# Patient Record
Sex: FEMALE | URBAN - METROPOLITAN AREA
[De-identification: names, ages, dates, MRNs, and addresses within clinical notes are randomized per-mention and may not be internally consistent; named-entity substitution may affect disease eponyms.]

---

## 2018-06-05 ENCOUNTER — APPOINTMENT (RX ONLY)
Dept: URBAN - METROPOLITAN AREA CLINIC 97 | Facility: CLINIC | Age: 23
Setting detail: DERMATOLOGY
End: 2018-06-05

## 2018-06-05 DIAGNOSIS — L70.0 ACNE VULGARIS: ICD-10-CM

## 2018-06-05 PROBLEM — L85.3 XEROSIS CUTIS: Status: ACTIVE | Noted: 2018-06-05

## 2018-06-05 PROCEDURE — ? COUNSELING

## 2018-06-05 PROCEDURE — ? PRESCRIPTION

## 2018-06-05 PROCEDURE — ? RECOMMENDATIONS

## 2018-06-05 PROCEDURE — 99202 OFFICE O/P NEW SF 15 MIN: CPT

## 2018-06-05 PROCEDURE — ? PATIENT EDUCATION

## 2018-06-05 RX ORDER — DOXYCYCLINE HYCLATE 100 MG/1
CAPSULE, GELATIN COATED ORAL
Qty: 30 | Refills: 0 | Status: ERX | COMMUNITY
Start: 2018-06-05

## 2018-06-05 RX ORDER — CLINDAMYCIN PHOSPHATE AND BENZOYL PEROXIDE 10; 50 MG/G; MG/G
GEL TOPICAL
Qty: 1 | Refills: 3 | Status: ERX | COMMUNITY
Start: 2018-06-05

## 2018-06-05 RX ADMIN — CLINDAMYCIN PHOSPHATE AND BENZOYL PEROXIDE: 10; 50 GEL TOPICAL at 00:00

## 2018-06-05 RX ADMIN — DOXYCYCLINE HYCLATE: 100 CAPSULE, GELATIN COATED ORAL at 00:00

## 2018-06-05 ASSESSMENT — LOCATION SIMPLE DESCRIPTION DERM
LOCATION SIMPLE: LEFT CHEEK
LOCATION SIMPLE: RIGHT UPPER BACK

## 2018-06-05 ASSESSMENT — LOCATION ZONE DERM
LOCATION ZONE: FACE
LOCATION ZONE: TRUNK

## 2018-06-05 ASSESSMENT — LOCATION DETAILED DESCRIPTION DERM
LOCATION DETAILED: RIGHT MID-UPPER BACK
LOCATION DETAILED: LEFT CENTRAL MALAR CHEEK

## 2018-06-05 NOTE — PROCEDURE: RECOMMENDATIONS
Recommendation Preamble: The following recommendations were made during the visit: Panoxyl wash
Detail Level: Zone

## 2018-06-05 NOTE — PROCEDURE: PATIENT EDUCATION
Glycolic Acid Chemical Peel Counseling: Pre-Peel Care/Maintenance Skin Care\\nCLEANSE:\\no	Sensitive or Dry Skin:  R-Essentials Gentle Antioxidant Soothing Cleanser (AM/PM)\\no	Tolerant Skin:  R-Essentials 5/2 or 10/2 Cleanser (AM/PM)\\nTREAT:\\no	Anti-Aging Treatment:  R-Essentials AHA 10% - 15% - 20% Cream (AM)\\no	Exfoliation Treatment:  R-Essentials Retinol Serum 2x - 3x - 5x - 10x (PM)\\no	Calm and Moisturize:  R-Essentials Triple Antioxidant Moisturizing Cream (AM/PM)\\no	Persistent Uneven Skin Tone: Prescription \\nPROTECT:\\n           Physical Sunscreen Cream SPF 30+ (Daily)\\n\\nPost-Peel Care (Day 1 through up to 10 days)\\nCLEANSE:\\no	Sensitive, Dry or Tolerant Skin:  R-Essentials Gentle Antioxidant Soothing Cleanser (AM/PM)\\nSOOTHE AND MOISTURIZE:\\n  (During the 3-5 day active peeling period, application of moisturizing creams may initially sting, however this should subside in 1 to 3 minutes)\\no	Perfect Peel 10 Soothing Recovery Ointment (apply as often as needed to irritated skin areas)\\no	R-Essentials Triple Antioxidant Moisturizing Cream (AM/PM)\\no	OTC 1% Hydrocortisone Cream for extremely irritated skin areas (up to three times/day)\\no	Additional moisturizing if needed:  R-Essentials Lite or Ultra Moisture Cream (AM/PM)\\nPROTECT:\\no	Physical Sunscreen Cream SPF 30+ (Daily)\\nRESUME:\\no	Pre-Peel Care/Maintenance Skin Care:  7-10 days post peel procedure or once your skin returns to normal\\n\\n(Remember to advance strength of R-Essentials AHA Cream (10% - 15%- 20%) and \\n            R-Essentials Retinol Serum (2x - 3x - 5x -10x) as skin tolerates to have optimal results)\\n

## 2018-06-05 NOTE — HPI: PIMPLES (ACNE)
How Severe Is Your Acne?: mild
Is This A New Presentation, Or A Follow-Up?: Acne
Additional Comments (Use Complete Sentences): Pt has Implanon for birth control

## 2018-06-05 NOTE — PROCEDURE: PATIENT EDUCATION
Automatic Acne Counseling: Acne is a skin condition affecting the hair follicles and oil glands.  The body areas with the most hair follicles and oil glands are the face, upper chest and back.  Acne develops as a result of blockages in the follicles.  A plug of dead skin cells and sebum, a naturally occurring oil, forms a micro-comedo that can enlarge to form an open comedone (blackhead) or a closed comedone (white head).  Under these circumstances, the normal occurring bacteria, Propionibacterium acnes, can cause inflammation around the comedone leading to papules (red bumps), pustules (pus bumps) and nodules (bumps under skin). \\n\\nSeveral factors can contribute to acne:\\n•	Hormonal - Androgen (a type of hormone) causes oil glands to enlarge and produce more sebum. This hormonal influence is mostly seen during puberty. \\n•	Genetics- History of family members (parents or siblings) with acne.\\n•	Stress                                     \\n•	External factors- Mechanical trauma (picking at lesions and/or aggressive exfoliation with skin brushes or overly gritty exfoliant products, use of cosmetics that promote pore clogging such as pomade products or products that are not “non-comedogenic), topical steroids, or some oral medications (such as lithium)\\n\\nAcne management focus:\\n•	Reduce skin shedding into the pore to prevent blockage\\n•	Kill the Propionibacterium acnes bacteria\\n•	Achieve anti-inflammatory effects in the skin\\n•	Oil gland regulation\\n\\nTreatment(s):\\n\\n•	Acne Cleansers:  Help to exfoliate skin, penetrate follicles and decrease inflammation. Wash face with cleanser every morning and night with cold water.\\n\\n•	Topical Retinoid creams/gels:  Help unplug and normalize the pore. (Initially, can cause skin dryness/scaling). Only use a pea size amount on the face and begin with short term contact (few evening hours every other day and advance to every night/overnight application. (If too much dryness/scaling occurs, use with a small amount of over the counter CeraVe cream).                                      \\n\\n•	Topical Antibiotic cream/gels:  Decrease the bacteria and inflammation in the pores. (Can cause short term redness and burning sensation). Only use a pea size amount every other morning and advance to every morning. \\n\\n•	Combination Topical cream/gels: (Retinoid and Antibiotic). See above. \\n\\n•	Oral Antibiotics: (Should NOT be pregnant and/or breastfeeding).  Solodyn, Minocycline, Doryx, and Doxycycline are the most commonly used oral antibiotics to treat moderate to severe acne and are safe to take intermittently for months at a time. Side effects include sun sensitivity, mild dizziness, stomach upset, and heartburn. (If you experience a sudden onset of a rash, severe/unusual headache, or stomach/gastro-intestinal problems, discontinue the medication and contact your medical provider immediately).\\n\\nOther treatment options to enhance results and/or if ineffectiveness noted with above usual acne treatments:\\n\\nBirth Control Pills - Females only (Ex. Ocella, Eleanor or Katheryn) - Decrease hormonal fluctuations that can affect acne and have anti-androgen activity.  We advise you to discuss this option with your Women’s Health provider/Primary Care Manager to be screened to see if you are a candidate for this treatment.\\n\\nSpironolactone - Females only - This oral medication is not FDA approved for acne treatment but is known to block testosterone (a hormone that aggravates acne) over a long course of treatment.  Since this medication is also used as a diuretic and can alter your electrolytes, baseline and periodic blood work is required.\\n\\nIsotretinoin: (Ex. Accutane, Amnesteem, Absorica, Zenatane, Myorisan and Claravis) – An oral retinoid (Vitamin A derivative) used to treat severe acne that does not respond to the above medications.  Accutane® improves or clears acne in over 80% of patients. Close medical supervision, monthly lab work and enrollment in [employer] program are required. The average period of treatment is 4-6 months.\\n\\nChemical peels and other facial treatments: Help improve skin texture, decrease pore size, decrease mild scarring & increase absorption of topical treatments. \\n Automatic Acne Counseling: Acne is a skin condition affecting the hair follicles and oil glands.  The body areas with the most hair follicles and oil glands are the face, upper chest and back.  Acne develops as a result of blockages in the follicles.  A plug of dead skin cells and sebum, a naturally occurring oil, forms a micro-comedo that can enlarge to form an open comedone (blackhead) or a closed comedone (white head).  Under these circumstances, the normal occurring bacteria, Propionibacterium acnes, can cause inflammation around the comedone leading to papules (red bumps), pustules (pus bumps) and nodules (bumps under skin). \\n\\nSeveral factors can contribute to acne:\\n•	Hormonal - Androgen (a type of hormone) causes oil glands to enlarge and produce more sebum. This hormonal influence is mostly seen during puberty. \\n•	Genetics- History of family members (parents or siblings) with acne.\\n•	Stress                                     \\n•	External factors- Mechanical trauma (picking at lesions and/or aggressive exfoliation with skin brushes or overly gritty exfoliant products, use of cosmetics that promote pore clogging such as pomade products or products that are not “non-comedogenic), topical steroids, or some oral medications (such as lithium)\\n\\nAcne management focus:\\n•	Reduce skin shedding into the pore to prevent blockage\\n•	Kill the Propionibacterium acnes bacteria\\n•	Achieve anti-inflammatory effects in the skin\\n•	Oil gland regulation\\n\\nTreatment(s):\\n\\n•	Acne Cleansers:  Help to exfoliate skin, penetrate follicles and decrease inflammation. Wash face with cleanser every morning and night with cold water.\\n\\n•	Topical Retinoid creams/gels:  Help unplug and normalize the pore. (Initially, can cause skin dryness/scaling). Only use a pea size amount on the face and begin with short term contact (few evening hours every other day and advance to every night/overnight application. (If too much dryness/scaling occurs, use with a small amount of over the counter CeraVe cream).                                      \\n\\n•	Topical Antibiotic cream/gels:  Decrease the bacteria and inflammation in the pores. (Can cause short term redness and burning sensation). Only use a pea size amount every other morning and advance to every morning. \\n\\n•	Combination Topical cream/gels: (Retinoid and Antibiotic). See above. \\n\\n•	Oral Antibiotics: (Should NOT be pregnant and/or breastfeeding).  Solodyn, Minocycline, Doryx, and Doxycycline are the most commonly used oral antibiotics to treat moderate to severe acne and are safe to take intermittently for months at a time. Side effects include sun sensitivity, mild dizziness, stomach upset, and heartburn. (If you experience a sudden onset of a rash, severe/unusual headache, or stomach/gastro-intestinal problems, discontinue the medication and contact your medical provider immediately).\\n\\nOther treatment options to enhance results and/or if ineffectiveness noted with above usual acne treatments:\\n\\nBirth Control Pills - Females only (Ex. Ocella, Eleanor or Katheryn) - Decrease hormonal fluctuations that can affect acne and have anti-androgen activity.  We advise you to discuss this option with your Women’s Health provider/Primary Care Manager to be screened to see if you are a candidate for this treatment.\\n\\nSpironolactone - Females only - This oral medication is not FDA approved for acne treatment but is known to block testosterone (a hormone that aggravates acne) over a long course of treatment.  Since this medication is also used as a diuretic and can alter your electrolytes, baseline and periodic blood work is required.\\n\\nIsotretinoin: (Ex. Accutane, Amnesteem, Absorica, Zenatane, Myorisan and Claravis) – An oral retinoid (Vitamin A derivative) used to treat severe acne that does not respond to the above medications.  Accutane® improves or clears acne in over 80% of patients. Close medical supervision, monthly lab work and enrollment in [employer] program are required. The average period of treatment is 4-6 months.\\n\\nChemical peels and other facial treatments: Help improve skin texture, decrease pore size, decrease mild scarring & increase absorption of topical treatments. \\n

## 2018-06-05 NOTE — PROCEDURE: COUNSELING
Topical Retinoid counseling:  Patient advised to apply a pea-sized amount only at bedtime and wait 30 minutes after washing their face before applying.  If too drying, patient may add a non-comedogenic moisturizer. The patient verbalized understanding of the proper use and possible adverse effects of retinoids.  All of the patient's questions and concerns were addressed.
Minocycline Pregnancy And Lactation Text: This medication is Pregnancy Category D and not consider safe during pregnancy. It is also excreted in breast milk.
Topical Clindamycin Counseling: Patient counseled that this medication may cause skin irritation or allergic reactions.  In the event of skin irritation, the patient was advised to reduce the amount of the drug applied or use it less frequently.   The patient verbalized understanding of the proper use and possible adverse effects of clindamycin.  All of the patient's questions and concerns were addressed.
Erythromycin Counseling:  I discussed with the patient the risks of erythromycin including but not limited to GI upset, allergic reaction, drug rash, diarrhea, increase in liver enzymes, and yeast infections.
Isotretinoin Pregnancy And Lactation Text: This medication is Pregnancy Category X and is considered extremely dangerous during pregnancy. It is unknown if it is excreted in breast milk.
Spironolactone Counseling: Patient advised regarding risks of diarrhea, abdominal pain, hyperkalemia, birth defects (for female patients), liver toxicity and renal toxicity. The patient may need blood work to monitor liver and kidney function and potassium levels while on therapy. The patient verbalized understanding of the proper use and possible adverse effects of spironolactone.  All of the patient's questions and concerns were addressed.
Topical Sulfur Applications Counseling: Topical Sulfur Counseling: Patient counseled that this medication may cause skin irritation or allergic reactions.  In the event of skin irritation, the patient was advised to reduce the amount of the drug applied or use it less frequently.   The patient verbalized understanding of the proper use and possible adverse effects of topical sulfur application.  All of the patient's questions and concerns were addressed.
Tazorac Pregnancy And Lactation Text: This medication is not safe during pregnancy. It is unknown if this medication is excreted in breast milk.
Minocycline Counseling: Patient advised regarding possible photosensitivity and discoloration of the teeth, skin, lips, tongue and gums.  Patient instructed to avoid sunlight, if possible.  When exposed to sunlight, patients should wear protective clothing, sunglasses, and sunscreen.  The patient was instructed to call the office immediately if the following severe adverse effects occur:  hearing changes, easy bruising/bleeding, severe headache, or vision changes.  The patient verbalized understanding of the proper use and possible adverse effects of minocycline.  All of the patient's questions and concerns were addressed.
Birth Control Pills Counseling: Birth Control Pill Counseling: I discussed with the patient the potential side effects of OCPs including but not limited to increased risk of stroke, heart attack, thrombophlebitis, deep venous thrombosis, hepatic adenomas, breast changes, GI upset, headaches, and depression.  The patient verbalized understanding of the proper use and possible adverse effects of OCPs. All of the patient's questions and concerns were addressed.
Azithromycin Counseling:  I discussed with the patient the risks of azithromycin including but not limited to GI upset, allergic reaction, drug rash, diarrhea, and yeast infections.
Tazorac Counseling:  Patient advised that medication is irritating and drying.  Patient may need to apply sparingly and wash off after an hour before eventually leaving it on overnight.  The patient verbalized understanding of the proper use and possible adverse effects of tazorac.  All of the patient's questions and concerns were addressed.
Include Pregnancy/Lactation Warning?: No
Spironolactone Pregnancy And Lactation Text: This medication can cause feminization of the male fetus and should be avoided during pregnancy. The active metabolite is also found in breast milk.
Bactrim Counseling:  I discussed with the patient the risks of sulfa antibiotics including but not limited to GI upset, allergic reaction, drug rash, diarrhea, dizziness, photosensitivity, and yeast infections.  Rarely, more serious reactions can occur including but not limited to aplastic anemia, agranulocytosis, methemoglobinemia, blood dyscrasias, liver or kidney failure, lung infiltrates or desquamative/blistering drug rashes.
Benzoyl Peroxide Counseling: Patient counseled that medicine may cause skin irritation and bleach clothing.  In the event of skin irritation, the patient was advised to reduce the amount of the drug applied or use it less frequently.   The patient verbalized understanding of the proper use and possible adverse effects of benzoyl peroxide.  All of the patient's questions and concerns were addressed.
Tetracycline Counseling: Patient counseled regarding possible photosensitivity and increased risk for sunburn.  Patient instructed to avoid sunlight, if possible.  When exposed to sunlight, patients should wear protective clothing, sunglasses, and sunscreen.  The patient was instructed to call the office immediately if the following severe adverse effects occur:  hearing changes, easy bruising/bleeding, severe headache, or vision changes.  The patient verbalized understanding of the proper use and possible adverse effects of tetracycline.  All of the patient's questions and concerns were addressed. Patient understands to avoid pregnancy while on therapy due to potential birth defects.
Erythromycin Pregnancy And Lactation Text: This medication is Pregnancy Category B and is considered safe during pregnancy. It is also excreted in breast milk.
Bactrim Pregnancy And Lactation Text: This medication is Pregnancy Category D and is known to cause fetal risk.  It is also excreted in breast milk.
Detail Level: Zone
Topical Clindamycin Pregnancy And Lactation Text: This medication is Pregnancy Category B and is considered safe during pregnancy. It is unknown if it is excreted in breast milk.
High Dose Vitamin A Counseling: Side effects reviewed, pt to contact office should one occur.
Dapsone Counseling: I discussed with the patient the risks of dapsone including but not limited to hemolytic anemia, agranulocytosis, rashes, methemoglobinemia, kidney failure, peripheral neuropathy, headaches, GI upset, and liver toxicity.  Patients who start dapsone require monitoring including baseline LFTs and weekly CBCs for the first month, then every month thereafter.  The patient verbalized understanding of the proper use and possible adverse effects of dapsone.  All of the patient's questions and concerns were addressed.
Topical Sulfur Applications Pregnancy And Lactation Text: This medication is Pregnancy Category C and has an unknown safety profile during pregnancy. It is unknown if this topical medication is excreted in breast milk.
Dapsone Pregnancy And Lactation Text: This medication is Pregnancy Category C and is not considered safe during pregnancy or breast feeding.
Birth Control Pills Pregnancy And Lactation Text: This medication should be avoided if pregnant and for the first 30 days post-partum.
High Dose Vitamin A Pregnancy And Lactation Text: High dose vitamin A therapy is contraindicated during pregnancy and breast feeding.
Benzoyl Peroxide Pregnancy And Lactation Text: This medication is Pregnancy Category C. It is unknown if benzoyl peroxide is excreted in breast milk.
Azithromycin Pregnancy And Lactation Text: This medication is considered safe during pregnancy and is also secreted in breast milk.
Doxycycline Counseling:  Patient counseled regarding possible photosensitivity and increased risk for sunburn.  Patient instructed to avoid sunlight, if possible.  When exposed to sunlight, patients should wear protective clothing, sunglasses, and sunscreen.  The patient was instructed to call the office immediately if the following severe adverse effects occur:  hearing changes, easy bruising/bleeding, severe headache, or vision changes.  The patient verbalized understanding of the proper use and possible adverse effects of doxycycline.  All of the patient's questions and concerns were addressed.
Topical Retinoid Pregnancy And Lactation Text: This medication is Pregnancy Category C. It is unknown if this medication is excreted in breast milk.
Isotretinoin Counseling: Patient should get monthly blood tests, not donate blood, not drive at night if vision affected, not share medication, and not undergo elective surgery for 6 months after tx completed. Side effects reviewed, pt to contact office should one occur.
Doxycycline Pregnancy And Lactation Text: This medication is Pregnancy Category D and not consider safe during pregnancy. It is also excreted in breast milk but is considered safe for shorter treatment courses.